# Patient Record
Sex: MALE | Race: BLACK OR AFRICAN AMERICAN | NOT HISPANIC OR LATINO | URBAN - METROPOLITAN AREA
[De-identification: names, ages, dates, MRNs, and addresses within clinical notes are randomized per-mention and may not be internally consistent; named-entity substitution may affect disease eponyms.]

---

## 2022-03-14 ENCOUNTER — OFFICE VISIT (OUTPATIENT)
Dept: FAMILY MEDICINE | Facility: CLINIC | Age: 32
End: 2022-03-14
Payer: COMMERCIAL

## 2022-03-14 VITALS
TEMPERATURE: 97.2 F | SYSTOLIC BLOOD PRESSURE: 94 MMHG | HEART RATE: 65 BPM | OXYGEN SATURATION: 97 % | BODY MASS INDEX: 18.43 KG/M2 | DIASTOLIC BLOOD PRESSURE: 60 MMHG | WEIGHT: 139.1 LBS | HEIGHT: 73 IN

## 2022-03-14 DIAGNOSIS — R14.0 ABDOMINAL BLOATING: ICD-10-CM

## 2022-03-14 DIAGNOSIS — K21.00 GASTROESOPHAGEAL REFLUX DISEASE WITH ESOPHAGITIS WITHOUT HEMORRHAGE: Primary | ICD-10-CM

## 2022-03-14 PROCEDURE — 99203 OFFICE O/P NEW LOW 30 MIN: CPT | Performed by: FAMILY MEDICINE

## 2022-03-14 NOTE — PROGRESS NOTES
Assessment & Plan     Gastroesophageal reflux disease with esophagitis without hemorrhage  - omeprazole (PRILOSEC) 20 MG DR capsule  Dispense: 30 capsule; Refill: 1    Abdominal bloating  - XR Abdomen 2 Views  I think he does have a gastroesophageal reflux disease, there is also bloating that could be as a result of increase the fecal material within the intestine.  I will get an x-ray and have him started on omeprazole.  Depending on the x-ray will consider further management.  I did explain this to him and will follow up depending on the x-ray report.  [id number]    No follow-ups on file.    Essence Aguilar MD  [facility] PARMINDER Srivastava is a 32 year old who presents for the following health issues     History of Present Illness       Reason for visit:  Gas/Heart burns going on for about 1 year.  Will abdominal discomfort described as bloatedness and a lot of gassiness and burping.  This does appear to be getting worse within the last couple of months.  Noted some heartburn and sensation of food being stuck in the throat.  Has no nausea or vomiting weight loss.  Has normal bowel movement.  Symptom onset:  More than a month  Symptoms include:  Too much burbing, noted not eating a lot.Has no change in the color of his stool.  Has had no weight loss.  Review of his records noted treatment for gastroesophageal reflux in 2017 at which he could not remember.  Symptom intensity:  Moderate  Symptom progression:  Worsening  Had these symptoms before:  Yes  Has tried/received treatment for these symptoms:  No  What makes it worse:  Spicy food and too much oil foods  What makes it better:  Ice cream    He eats 0-1 servings of fruits and vegetables daily.He consumes 1 sweetened beverage(s) daily.He exercises with enough effort to increase his heart rate 9 or less minutes per day.  He exercises with enough effort to increase his heart rate 3 or less days per week.   He is taking  "medications regularly.     No family history on file.   Social History     Socioeconomic History     Marital status: Single     Spouse name: Not on file     Number of children: Not on file     Years of education: Not on file     Highest education level: Not on file   Occupational History     Not on file   Tobacco Use     Smoking status: Never Smoker     Smokeless tobacco: Never Used     Tobacco comment: padmini phillip   Substance and Sexual Activity     Alcohol use: Not on file     Drug use: Not on file     Sexual activity: Not on file   Other Topics Concern     Not on file   Social History Narrative     Not on file     Social Determinants of Health     Financial Resource Strain: Not on file   Food Insecurity: Not on file   Transportation Needs: Not on file   Physical Activity: Not on file   Stress: Not on file   Social Connections: Not on file   Intimate Partner Violence: Not on file   Housing Stability: Not on file      No past surgical history on file.   No past medical history on file.       Review of Systems   CONSTITUTIONAL: NEGATIVE for fever, chills, change in weight  ENT/MOUTH: NEGATIVE for ear, mouth and throat problems  RESP: NEGATIVE for significant cough or SOB  CV: NEGATIVE for chest pain, palpitations or peripheral edema  GI: diarrhea, hemorrhoids and melena  ROS otherwise negative      Objective    BP 94/60 (BP Location: Left arm, Patient Position: Sitting, Cuff Size: Adult Regular)   Pulse 65   Temp 97.2  F (36.2  C) (Oral)   Ht 1.854 m (6' 1\")   Wt 63.1 kg (139 lb 1.6 oz)   SpO2 97%   BMI 18.35 kg/m    Body mass index is 18.35 kg/m .  Physical Exam   GENERAL: healthy, alert, no distress and asthenic.  NECK: no adenopathy, no asymmetry, masses, or scars and thyroid normal to palpation  RESP: lungs clear to auscultation - no rales, rhonchi or wheezes  CV: regular rate and rhythm, normal S1 S2, no murmur, no peripheral edema and peripheral pulses strong  ABDOMEN: soft, nontender, no " hepatosplenomegaly, no masses and bowel sounds normal tympanitic on percussion.  MS: no gross musculoskeletal defects noted, no edema